# Patient Record
Sex: MALE | URBAN - METROPOLITAN AREA
[De-identification: names, ages, dates, MRNs, and addresses within clinical notes are randomized per-mention and may not be internally consistent; named-entity substitution may affect disease eponyms.]

---

## 2021-09-05 ENCOUNTER — EMERGENCY (EMERGENCY)
Facility: HOSPITAL | Age: 21
LOS: 1 days | Discharge: HOME | End: 2021-09-05
Admitting: EMERGENCY MEDICINE
Payer: MEDICAID

## 2021-09-05 VITALS
RESPIRATION RATE: 18 BRPM | WEIGHT: 175.05 LBS | TEMPERATURE: 98 F | HEIGHT: 76 IN | DIASTOLIC BLOOD PRESSURE: 61 MMHG | OXYGEN SATURATION: 97 % | SYSTOLIC BLOOD PRESSURE: 105 MMHG | HEART RATE: 61 BPM
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
## 2021-09-05 DIAGNOSIS — S81.811D LACERATION WITHOUT FOREIGN BODY, RIGHT LOWER LEG, SUBSEQUENT ENCOUNTER: ICD-10-CM

## 2021-09-05 DIAGNOSIS — T81.40XA INFECTION FOLLOWING A PROCEDURE, UNSPECIFIED, INITIAL ENCOUNTER: ICD-10-CM
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
## 2021-09-05 DIAGNOSIS — F17.200 NICOTINE DEPENDENCE, UNSPECIFIED, UNCOMPLICATED: ICD-10-CM
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
## 2021-09-05 DIAGNOSIS — L08.9 LOCAL INFECTION OF THE SKIN AND SUBCUTANEOUS TISSUE, UNSPECIFIED: ICD-10-CM

## 2021-09-05 PROCEDURE — 99053 MED SERV 10PM-8AM 24 HR FAC: CPT

## 2021-09-05 PROCEDURE — 99283 EMERGENCY DEPT VISIT LOW MDM: CPT

## 2021-09-05 RX ORDER — IBUPROFEN 200 MG
1 TABLET ORAL
Qty: 20 | Refills: 0
Start: 2021-09-05

## 2021-09-05 RX ORDER — IBUPROFEN 200 MG
600 TABLET ORAL ONCE
Refills: 0 | Status: COMPLETED | OUTPATIENT
Start: 2021-09-05 | End: 2021-09-05

## 2021-09-05 RX ADMIN — Medication 1 TABLET(S): at 07:37

## 2021-09-05 RX ADMIN — Medication 600 MILLIGRAM(S): at 07:37

## 2021-09-05 NOTE — ED PROVIDER NOTE - CLINICAL SUMMARY MEDICAL DECISION MAKING FREE TEXT BOX
pt s/p traumatic wound to the RLE with skin flap sutured 2d ago, wound appears intact but noted proximal flap with be discolored with likely non viable tissue and mild surrounding erythema, NV intact, no focal collection, local wound care provided, bacitracin and xeroform applied, course of duricef, f/u with plastic, pt verbalized understanding

## 2021-09-05 NOTE — ED ADULT NURSE NOTE - CHIEF COMPLAINT QUOTE
Pt ambulatory into triage w/ steady gait, requesting wound check for laceration to right shin. Pt states he cut RLE on fence x2 days ago, was seen at [facility] ED and received sutures. Pt admits to pain, denies fevers, endorses he is not UTD on Tdap. Sutures intact, no drainage or bleeding noted.

## 2021-09-05 NOTE — ED PROVIDER NOTE - PHYSICAL EXAMINATION
Gen - WDWN M, NAD, comfortable and non-toxic appearing  Skin - warm, dry, skin flap laceration to the RLE proximal shin region with mild surrounding erythema and hematoma/hemorrhagic blister to the flap section, no active dc, bleeding, or warmth, no crepitus or streaking   HEENT - AT/NC, airway patent, neck supple   CV - S1S2, R/R/R  Resp - CTAB, no r/r/w  GI - soft, ND, NT, no CVAT b/l   MS - w/w/p, RLE with wound per skin section, NV intact, FROM, symmetric distal pulses, compartment soft   Neuro - AxOx3, ambulatory without gait disturbance

## 2021-09-05 NOTE — ED PROVIDER NOTE - OBJECTIVE STATEMENT
19 yo M with no known PMHx, seen at [facility] 2d ago with RLE laceration s/p mechanical fall and repaired with 7 sutures, presenting today for wound check.  Pt reports mild pain and redness to the region and is concerned about wound healing.  Denies fever, chills, FB sensation, change in ROM/sensation, paresthesia, purulent d/c, N/V, HA, dizziness, LOC, CP, SOB, and focal weakness

## 2021-09-05 NOTE — ED ADULT TRIAGE NOTE - CHIEF COMPLAINT QUOTE
Pt requesting wound check for laceration to right shin. Pt states he cut RLE on fence x2 days ago, was seen at [facility] ED and received sutures. Pt admits to pain, denies fevers. Sutures intact, no drainage or bleeding noted. Pt ambulatory into triage w/ steady gait, requesting wound check for laceration to right shin. Pt states he cut RLE on fence x2 days ago, was seen at [facility] ED and received sutures. Pt admits to pain, denies fevers. Sutures intact, no drainage or bleeding noted. Pt ambulatory into triage w/ steady gait, requesting wound check for laceration to right shin. Pt states he cut RLE on fence x2 days ago, was seen at [facility] ED and received sutures. Pt admits to pain, denies fevers, endorses he is not UTD on Tdap. Sutures intact, no drainage or bleeding noted.

## 2021-09-05 NOTE — ED PROVIDER NOTE - CARE PROVIDER_API CALL
Adan Stone)  Plastic Surgery  [address], [address]  [place] [address]  Phone: [phone]  Fax: [phone]  Follow Up Time: 7-10 Days    Luciaon Mcgowan  Internal Medicine  [address] [place] [address]  Phone: [phone]  Fax: [phone]  Follow Up Time:

## 2021-09-05 NOTE — ED PROVIDER NOTE - PATIENT PORTAL LINK FT
You can access the FollowMyHealth Patient Portal offered by [facility] by registering at the following website: [email or web address]. By joining [facility]’s FollowMyHealth portal, you will also be able to view your health information using other applications (apps) compatible with our system.

## 2021-09-05 NOTE — ED PROVIDER NOTE - NSFOLLOWUPCLINICS_GEN_ALL_ED_FT
[facility]  HIV/AIDS Research & Treatment  [address] [place] [address]  Phone: [phone]  Fax: